# Patient Record
Sex: MALE | Race: WHITE | URBAN - METROPOLITAN AREA
[De-identification: names, ages, dates, MRNs, and addresses within clinical notes are randomized per-mention and may not be internally consistent; named-entity substitution may affect disease eponyms.]

---

## 2017-04-18 ENCOUNTER — OFFICE VISIT (OUTPATIENT)
Dept: OPTOMETRY | Facility: CLINIC | Age: 82
End: 2017-04-18
Payer: COMMERCIAL

## 2017-04-18 DIAGNOSIS — G51.0 BELL'S PALSY: Primary | ICD-10-CM

## 2017-04-18 PROCEDURE — 99202 OFFICE O/P NEW SF 15 MIN: CPT | Performed by: OPTOMETRIST

## 2017-04-18 ASSESSMENT — VISUAL ACUITY
CORRECTION_TYPE: GLASSES
METHOD: SNELLEN - LINEAR
OS_CC: 20/40
OD_CC: 20/40

## 2017-04-18 ASSESSMENT — TONOMETRY
IOP_METHOD: TONOPEN
OS_IOP_MMHG: 15
OD_IOP_MMHG: 15

## 2017-04-18 NOTE — PROGRESS NOTES
Chief Complaint   Patient presents with     Eye Problem     right eye           HPI    Affected eye(s):  Right   Symptoms:        Duration:  2 weeks   Frequency:  Constant          Comments:  Patient right side drops. [facility]. Patient's wife thinks he has bells palsy. Patient was told to tape eye shut. Patient left lower lid falls, watery eye and red           Using Genteal ointment during the day and Systane Gel at night, patches right eye   Isamar Leos, OD     See Review Of Systems     HPI and ROS performed by Optometrist  scribed by Maria Teresa Villavicencio, Optometric Tech    Medical, surgical and family histories reviewed and updated 4/18/2017.         OBJECTIVE: See Ophthalmology exam    ASSESSMENT:    ICD-10-CM    1. Bell's palsy G51.0       PLAN:  Continue using Genteal and Systane Ointment during the day and at night  Tape lid shut at night  Bell's Palsy will generally go away on its own, it just takes time.  Return to clinic in a few weeks to have the cornea checked.      Isamar Leos O.D.  [address]  [address]    [phone]

## 2017-04-18 NOTE — MR AVS SNAPSHOT
"              After Visit Summary   4/18/2017    Marcelino Johnson    MRN: [id number]           Patient Information     Date Of Birth          1/2/1927        Visit Information        Provider Department      4/18/2017 2:00 PM Isamar Leos OD [facility]        Today's Diagnoses     Bell's palsy    -  1      Care Instructions    Continue using Genteal and Systane Ointment during the day and at night  Tape lid shut at night  Bell's Palsy will generally go away on its own, it just take time.  Return to clinic in a few weeks to have the cornea checked.      Isamar Leos O.D.  [facility]  [address] [place]  [address]    [phone]          Follow-ups after your visit        Follow-up notes from your care team     Return in about 2 weeks (around 5/2/2017) for Recheck.      Who to contact     If you have questions or need follow up information about today's clinic visit or your schedule please contact [facility] directly at [phone].  Normal or non-critical lab and imaging results will be communicated to you by [employer], letter or phone within 4 business days after the clinic has received the results. If you do not hear from us within 7 days, please contact the clinic through [employer] or phone. If you have a critical or abnormal lab result, we will notify you by phone as soon as possible.  Submit refill requests through [employer] or call your pharmacy and they will forward the refill request to us. Please allow 3 business days for your refill to be completed.          Additional Information About Your Visit        MyChart Information     [employer] lets you send messages to your doctor, view your test results, renew your prescriptions, schedule appointments and more. To sign up, go to [email or web address] . Click on \"Log in\" on the left side of the screen, which will take you to the Welcome page. Then click on \"Sign up Now\" on the right side of the " page.     You will be asked to enter the access code listed below, as well as some personal information. Please follow the directions to create your username and password.     Your access code is: [email or web address]  Expires: 2017  2:35 PM     Your access code will  in 90 days. If you need help or a new code, please call your [place] clinic or [phone].        Care EveryWhere ID     This is your Care EveryWhere ID. This could be used by other organizations to access your [place] medical records  [id number]         Blood Pressure from Last 3 Encounters:   No data found for BP    Weight from Last 3 Encounters:   No data found for Wt              Today, you had the following     No orders found for display       Primary Care Provider Office Phone # Fax #    Nilay Thompson [phone]       [facility] [address] [place] [address]        Thank you!     Thank you for choosing [facility]  for your care. Our goal is always to provide you with excellent care. Hearing back from our patients is one way we can continue to improve our services. Please take a few minutes to complete the written survey that you may receive in the mail after your visit with us. Thank you!             Your Updated Medication List - Protect others around you: Learn how to safely use, store and throw away your medicines at www.disposemymeds.org.          This list is accurate as of: 17  2:35 PM.  Always use your most recent med list.                   Brand Name Dispense Instructions for use    LOVASTATIN PO          MEMANTINE HCL PO          METFORMIN HCL PO          SERTRALINE HCL PO      Take by mouth daily

## 2017-04-18 NOTE — PATIENT INSTRUCTIONS
Continue using Genteal and Systane Ointment during the day and at night  Tape lid shut at night  Bell's Palsy will generally go away on its own, it just takes time.  Return to clinic in a few weeks to have the cornea checked.      Isamar Leos O.D.  [address]  [address]    [phone]

## 2017-04-19 ENCOUNTER — TELEPHONE (OUTPATIENT)
Dept: OPTOMETRY | Facility: CLINIC | Age: 82
End: 2017-04-19

## 2017-04-19 NOTE — TELEPHONE ENCOUNTER
Mat from the [facility] called stating the patient should possibly be referred to an Occuplastic Specialist. Who would Dr. Leos recommend? The diagnosis for the patient from the previous provider was exposure to Keratitis/conjunctivitis.    [facility] TODAY as they are limited on time for a referral and appointment. Patient had seen Dr. Leos 4/18/17. Please call Mat at [phone] as soon as possible. Thank you.

## 2017-04-19 NOTE — TELEPHONE ENCOUNTER
[facility] and let her know that he did not have to be referred to an Occuplastic Specialist.  He has Bell's Palsy and it should go away on it's own with time.  I just wanted him to be seen by an optometrist or ophthalmologist to recheck the cornea.  He can't blink so the eye may get very dry and he can develop exposure keratitis.  I told Marcelino and his wife that they should have it checked in a couple of weeks and that he could either come here or [facility].      Isamar Leos O.D.  [address]  [address]    [phone]

## 2017-04-20 ASSESSMENT — SLIT LAMP EXAM - LIDS: COMMENTS: NORMAL

## 2017-04-20 ASSESSMENT — EXTERNAL EXAM - LEFT EYE: OS_EXAM: NORMAL

## 2017-05-12 ENCOUNTER — OFFICE VISIT (OUTPATIENT)
Dept: OPHTHALMOLOGY | Facility: CLINIC | Age: 82
End: 2017-05-12
Payer: COMMERCIAL

## 2017-05-12 DIAGNOSIS — G51.0 FACIAL PALSY: Primary | ICD-10-CM

## 2017-05-12 PROCEDURE — 92002 INTRM OPH EXAM NEW PATIENT: CPT | Performed by: OPHTHALMOLOGY

## 2017-05-12 RX ORDER — ERYTHROMYCIN 5 MG/G
OINTMENT OPHTHALMIC
Qty: 1 TUBE | Refills: 11 | Status: SHIPPED | OUTPATIENT
Start: 2017-05-12

## 2017-05-12 ASSESSMENT — VISUAL ACUITY
OD_CC: 20/200
METHOD: SNELLEN - LINEAR
OS_CC: 20/50+2

## 2017-05-12 NOTE — PROGRESS NOTES
Current Eye Medications: Genteal gel tid during day, artificial tears tid daily and Systane gel at night.      Subjective: Keratitis  secondary to [place] palsy right side  referred by Doctor Deric   As above for about the past month Pt has [place] palsy  Pt wife reports she tapes pt eye shut 3 times a day, needs to untape to put in drops and oint.     Onset in March 2017 after scalp surgery; had preauricular surgery on right but no problems after.  ([facility])     Objective:  See Ophthalmology Exam.       Assessment:  Right facial nerve palsy.  No significant corneal issues at present.      Plan: Use Erythromycin ointment right eye four times daily and at bedtime.  Can also use frequent artificial tears.  No need to tape lid at present.  Try to manually distribute ointment with eyelid closure.  Return visit one month for an MD check.  Could consider plastics if not improving.  Pelon Gutiérrez M.D.

## 2017-05-12 NOTE — MR AVS SNAPSHOT
"              After Visit Summary   5/12/2017    Marcelino Johnson    MRN: [id number]           Patient Information     Date Of Birth          1/2/1927        Visit Information        Provider Department      5/12/2017 2:45 PM Pelon Gutiérrez MD [facility]        Today's Diagnoses     Bell's palsy    -  1      Care Instructions    Use Erythromycin ointment right eye four times daily and at bedtime.  Can also use frequent artificial tears.  No need to tape lid at present.  Try to manually distribute ointment with eyelid closure.  Return visit one month for an MD check.  Pelon Gutiérrez M.D.          Follow-ups after your visit        Who to contact     If you have questions or need follow up information about today's clinic visit or your schedule please contact [facility] directly at [phone].  Normal or non-critical lab and imaging results will be communicated to you by [employer], letter or phone within 4 business days after the clinic has received the results. If you do not hear from us within 7 days, please contact the clinic through [employer] or phone. If you have a critical or abnormal lab result, we will notify you by phone as soon as possible.  Submit refill requests through [employer] or call your pharmacy and they will forward the refill request to us. Please allow 3 business days for your refill to be completed.          Additional Information About Your Visit        MyChart Information     [employer] lets you send messages to your doctor, view your test results, renew your prescriptions, schedule appointments and more. To sign up, go to [email or web address] . Click on \"Log in\" on the left side of the screen, which will take you to the Welcome page. Then click on \"Sign up Now\" on the right side of the page.     You will be asked to enter the access code listed below, as well as some personal information. Please follow the directions to create your username and password.   "   Your access code is: [email or web address]  Expires: 2017  2:35 PM     Your access code will  in 90 days. If you need help or a new code, please call your [facility] or [phone].        Care EveryWhere ID     This is your Care EveryWhere ID. This could be used by other organizations to access your [place] medical records  [id number]         Blood Pressure from Last 3 Encounters:   No data found for BP    Weight from Last 3 Encounters:   No data found for Wt              Today, you had the following     No orders found for display         Today's Medication Changes          These changes are accurate as of: 17  4:01 PM.  If you have any questions, ask your nurse or doctor.               Start taking these medicines.        Dose/Directions    erythromycin ophthalmic ointment   Commonly known as:  ROMYCIN   Used for:  Bell's palsy        Apply small strip into right lower eyelid four times daily and at bedtime.   Quantity:  1 Tube   Refills:  11            Where to get your medicines      These medications were sent to [facility]  - [place], [place] - [address]  [address] [address]     Phone:  [phone]     erythromycin ophthalmic ointment                Primary Care Provider Office Phone # Fax #    Nilay Thompson [phone]       [facility] [address] [place] [address]        Thank you!     Thank you for choosing [facility]  for your care. Our goal is always to provide you with excellent care. Hearing back from our patients is one way we can continue to improve our services. Please take a few minutes to complete the written survey that you may receive in the mail after your visit with us. Thank you!             Your Updated Medication List - Protect others around you: Learn how to safely use, store and throw away your medicines at www.disposemymeds.org.          This list is accurate as of: 17  4:01 PM.   Always use your most recent med list.                   Brand Name Dispense Instructions for use    erythromycin ophthalmic ointment    ROMYCIN    1 Tube    Apply small strip into right lower eyelid four times daily and at bedtime.       LOVASTATIN PO          MEMANTINE HCL PO          METFORMIN HCL PO          SERTRALINE HCL PO      Take by mouth daily

## 2017-05-12 NOTE — PATIENT INSTRUCTIONS
Use Erythromycin ointment right eye four times daily and at bedtime.  Can also use frequent artificial tears.  No need to tape lid at present.  Try to manually distribute ointment with eyelid closure.  Return visit one month for an MD check.  Pelon Gutiérrez M.D.

## 2017-05-14 PROBLEM — G51.0 FACIAL PALSY: Status: ACTIVE | Noted: 2017-05-14

## 2017-05-14 PROBLEM — G51.0 BELL'S PALSY: Status: ACTIVE | Noted: 2017-05-14

## 2017-05-14 PROBLEM — G51.0 BELL'S PALSY: Status: RESOLVED | Noted: 2017-05-14 | Resolved: 2017-05-14

## 2017-05-14 ASSESSMENT — EXTERNAL EXAM - LEFT EYE: OS_EXAM: NORMAL

## 2017-05-14 ASSESSMENT — SLIT LAMP EXAM - LIDS: COMMENTS: NORMAL
